# Patient Record
Sex: FEMALE | Race: WHITE
[De-identification: names, ages, dates, MRNs, and addresses within clinical notes are randomized per-mention and may not be internally consistent; named-entity substitution may affect disease eponyms.]

---

## 2018-02-19 ENCOUNTER — HOSPITAL ENCOUNTER (EMERGENCY)
Dept: HOSPITAL 45 - C.EDB | Age: 18
LOS: 1 days | Discharge: HOME | End: 2018-02-20
Payer: COMMERCIAL

## 2018-02-19 VITALS
BODY MASS INDEX: 19.84 KG/M2 | HEIGHT: 64.02 IN | WEIGHT: 116.18 LBS | BODY MASS INDEX: 19.84 KG/M2 | HEIGHT: 64.02 IN | WEIGHT: 116.18 LBS

## 2018-02-19 DIAGNOSIS — Z88.5: ICD-10-CM

## 2018-02-19 DIAGNOSIS — Z91.040: ICD-10-CM

## 2018-02-19 DIAGNOSIS — M43.6: Primary | ICD-10-CM

## 2018-02-20 VITALS
DIASTOLIC BLOOD PRESSURE: 69 MMHG | SYSTOLIC BLOOD PRESSURE: 117 MMHG | OXYGEN SATURATION: 98 % | HEART RATE: 72 BPM | TEMPERATURE: 98.6 F

## 2018-02-20 NOTE — EMERGENCY ROOM VISIT NOTE
ED Visit Note


First contact with patient:  23:16


CHIEF COMPLAINT: Neck pain








HISTORY OF PRESENT ILLNESS: This 17-year-old female patient presents to the 

emergency department, ambulatory, complaining of pain in the right side of the 

neck which began while working out.  The patient states she was dead lifting, 

and did not notice any specific injury, but when she stood up and stretched her 

back and neck, she noticed an immediate spasm and pain in the right side of the 

neck radiating towards the shoulder.  The patient rates the pain as sharp and 7/

10.  The patient has taken 600 mg ibuprofen for the pain without significant 

improvement.  The patient does not have a history of previous neck problems.  

The patient does not have pain of the arms and shoulders.  The pain does 

radiate towards the right scapula, but does stay in the back and neck region.  

The patient denies numbness and tingling.  The patient denies chest pain or 

shortness of breath.  There was no head injury and no loss of consciousness.  

The patient denies headache, blurred vision, abdominal pain, nausea, or 

vomiting.  The patient denies change in personality.  








REVIEW OF SYSTEMS: A 10 system review of systems was completed with positives 

and pertinent negatives listed in the HPI. 








ALLERGIES: Vicodin, latex








MEDICATIONS: OCPs








PMH: None








SOCIAL HISTORY: The patient lives locally with her roommates.  She is a [employer] 

[employer] student.  She denies drug, alcohol, tobacco use.








PHYSICAL EXAM: 


VITALS: Vitals are noted on the nurse's note and reviewed by myself.  Vital 

signs stable.  GENERAL: This is a 17-year-old female, in no acute distress, 

nondiaphoretic, well-developed well-nourished.  SKIN: Capillary reflex less 

than 2 seconds.  HEENT: Normocephalic.  PERRLA.  EOMI.  Nares patent.  Mucous 

membranes moist.  Neck is supple without nuchal rigidity.  Cervical spine is 

mildly tender to palpation.  The patient does have significant tenderness of 

the paraspinal muscles on the right.  There is no lymphadenopathy.  

MUSCULOSKELETAL: The patient has full range of motion of the bilateral arms.  

Strength 5/5 of the bilateral upper extremities.  The patient has tenderness 

with any attempts at movement of the neck.   She is most comfortable with her 

head gazing slightly to the left.  NEURO: Patient was alert and oriented to 

person place and time.  Normal sensation to light and sharp touch.  No focal 

neurologic deficits.








EMERGENCY DEPARTMENT COURSE: I examined the patient.  She was given 5 mg Valium 

p.o. and 60 mg Toradol IM.  X-ray of the cervical spine was ordered and 

reviewed by myself.  This did not show any obvious acute fractures, dislocations

, or subluxations.  This will be reviewed by radiologist tomorrow morning.  The 

patient was reassessed and was minimally feeling better.  The patient was 

feeling well enough to go home.  She will be started on anti-inflammatory 

medications and muscle relaxers.  She will be given a home pack of Valium until 

she can get her prescription tomorrow. She was encouraged to follow-up with 

[facility] later this week for ongoing evaluation and recheck.  

Discharge instructions reviewed, and patient was discharged home in good 

condition.





I attest that I have personally reviewed the patient's current medication list. 


Patient was found to have normal blood pressure on screening and does not 

require follow-up. 








Differential diagnosis includes cervicalgia, torticollis, fracture, subluxation

, dislocation, abscess, infection, malignancy, and others








DIAGNOSIS: Torticollis


Current/Historical Medications


Scheduled


Birth Control Pills (Birth Control Pills), 1 TAB PO DAILY


Naproxen (Naprosyn), 500 MG PO BID





Scheduled PRN


Diazepam (Valium), 5 MG PO QID PRN for Muscle Spasms





Allergies


Coded Allergies:  


     Acetaminophen (Verified  Allergy, Unknown, Throat swelling and vomiting, 2/ 19/18)


     Hydrocodone (Verified  Allergy, Unknown, Throat swelling and vomiting, 2/19 /18)


     Latex (Verified  Allergy, Unknown, Unknown, 2/19/18)





Vital Signs











  Date Time  Temp Pulse Resp B/P (MAP) Pulse Ox O2 Delivery O2 Flow Rate FiO2


 


2/20/18 00:28  72 20 117/69 98 Room Air  


 


2/19/18 21:47 37.0 99 20 121/83 100 Room Air  











Medications Administered











 Medications


  (Trade)  Dose


 Ordered  Sig/Yrn


 Route  Start Time


 Stop Time Status Last Admin


Dose Admin


 


 Diazepam


  (Valium Tab)  5 mg  NOW  STAT


 PO  2/19/18 23:37


 2/19/18 23:38 DC 2/19/18 23:37


5 MG


 


 Ketorolac


 Tromethamine


  (Toradol Inj)  60 mg  NOW  STAT


 IM  2/19/18 23:37


 2/19/18 23:38 DC 2/19/18 23:37


60 MG











Departure Information


Impression





 Primary Impression:  


 Torticollis, acute





Dispostion


Home / Self-Care





Condition


GOOD





Prescriptions





Naproxen (Naprosyn) 500 Mg Tab


500 MG PO BID, #60 TAB


   Prov: Amy Banerjee PA-C         2/20/18 


Diazepam (Valium) 5 Mg Tab


5 MG PO QID Y for Muscle Spasms for 3 Days, #12 TAB


   Prov: Amy Banerjee PA-C         2/20/18





Referrals


[facility] (PCP)





Patient Instructions


My [facility], Torticollis





Additional Instructions





You have been treated in the Emergency Department for Neck Pain. You have 

received pain medicine in the emergency department which impairs your ability 

to operate a vehicle. It is illegal for you to drive after receiving these 

medicines. 





You have been prescribed Valium 1 tab orally, three to four times per day. Do 

NOT exceed 20 mg (4 tabs) per day. Take your first dose at bedtime as it can 

make you drowsy. Always take all medications as prescribed.





For pain control, you can use the following over-the-counter medicines (if >13 yo):


Naproxen may be used for fever or pain.  Use 500mg every twelve hours as 

needed.  Take with food.  Avoid using more than 1000mg in a 24 hour period.  Do 

not use 1000mg per day for more than three consecutive days without physician 

direction.  Prolonged inappropriate use can lead to stomach upset or ulcers.  

As discussed, do not take any other NSAIDs including ibuprofen, Advil, Aleve, 

Motrin, or other anti-inflammatory medications while on this drug.


(AND/OR)


Acetaminophen(Tylenol) may be used for fever or pain.  Use 1000mg every six 

hours as needed.  Avoid using more than 3000mg in a 24 hour period.  





If this is an acute injury, ice can be applied to the area of pain for the 

first 3 days to help decrease pain and inflammation. After the first 3 days, a 

heating pad can be used over the area for continued soothing relief.





You should schedule a follow-up appointment in 2-3 days with your Primary Care 

Provider/[facility] for further evaluation and treatment of 

your neck pain.





Return to the Emergency Department if your current symptoms worsen despite 

treatment course outlined above, or if you develop any of the following symptoms

: intractable pain despite aforementioned treatment course, facial droop, 

slurred speech, unilateral weakness, or worsening of her current symptoms.





Work Instructions


Return To Work:  1 day

## 2018-02-20 NOTE — DIAGNOSTIC IMAGING REPORT
CERVICAL SPINE 2 OR 3 VIEWS



CLINICAL HISTORY: Neck pain.    



COMPARISON STUDY:  No previous studies for comparison.



FINDINGS: Alignment of the cervical spine is anatomic. Vertebral body heights

are maintained. There is no fracture or suspicious lesion. Disc spaces are

preserved. Facet joints appear intact.



IMPRESSION:  No acute cervical spine fracture or subluxation. 









Electronically signed by:  Kwaku Pearson M.D.

2/20/2018 7:37 AM



Dictated Date/Time:  2/20/2018 7:36 AM